# Patient Record
Sex: MALE | Race: WHITE | NOT HISPANIC OR LATINO
[De-identification: names, ages, dates, MRNs, and addresses within clinical notes are randomized per-mention and may not be internally consistent; named-entity substitution may affect disease eponyms.]

---

## 2022-12-08 DIAGNOSIS — Z86.79 PERSONAL HISTORY OF OTHER DISEASES OF THE CIRCULATORY SYSTEM: ICD-10-CM

## 2022-12-08 DIAGNOSIS — Z86.39 PERSONAL HISTORY OF OTHER ENDOCRINE, NUTRITIONAL AND METABOLIC DISEASE: ICD-10-CM

## 2022-12-08 DIAGNOSIS — Z87.39 PERSONAL HISTORY OF OTHER DISEASES OF THE MUSCULOSKELETAL SYSTEM AND CONNECTIVE TISSUE: ICD-10-CM

## 2022-12-08 DIAGNOSIS — Z78.9 OTHER SPECIFIED HEALTH STATUS: ICD-10-CM

## 2022-12-08 DIAGNOSIS — Z87.891 PERSONAL HISTORY OF NICOTINE DEPENDENCE: ICD-10-CM

## 2022-12-12 RX ORDER — OMEGA-3/DHA/EPA/FISH OIL 300-1000MG
CAPSULE ORAL
Refills: 0 | Status: ACTIVE | COMMUNITY

## 2022-12-12 RX ORDER — LOSARTAN POTASSIUM 100 MG/1
100 TABLET, FILM COATED ORAL
Refills: 0 | Status: ACTIVE | COMMUNITY

## 2022-12-12 RX ORDER — ALLOPURINOL 300 MG/1
300 TABLET ORAL
Refills: 0 | Status: ACTIVE | COMMUNITY

## 2022-12-12 RX ORDER — ATORVASTATIN CALCIUM 10 MG/1
10 TABLET, FILM COATED ORAL
Refills: 0 | Status: ACTIVE | COMMUNITY

## 2022-12-12 RX ORDER — METOLAZONE 5 MG/1
5 TABLET ORAL
Refills: 0 | Status: ACTIVE | COMMUNITY

## 2022-12-12 RX ORDER — METOPROLOL SUCCINATE 50 MG/1
50 TABLET, EXTENDED RELEASE ORAL
Refills: 0 | Status: ACTIVE | COMMUNITY

## 2022-12-12 RX ORDER — MULTIVITAMIN
TABLET ORAL
Refills: 0 | Status: ACTIVE | COMMUNITY

## 2022-12-12 RX ORDER — APIXABAN 5 MG/1
5 TABLET, FILM COATED ORAL
Refills: 0 | Status: ACTIVE | COMMUNITY

## 2022-12-12 RX ORDER — POTASSIUM CHLORIDE 1500 MG/1
20 TABLET, FILM COATED, EXTENDED RELEASE ORAL
Refills: 0 | Status: ACTIVE | COMMUNITY

## 2022-12-13 DIAGNOSIS — Z84.0 FAMILY HISTORY OF DISEASES OF THE SKIN AND SUBCUTANEOUS TISSUE: ICD-10-CM

## 2022-12-13 DIAGNOSIS — L40.9 PSORIASIS, UNSPECIFIED: ICD-10-CM

## 2022-12-13 DIAGNOSIS — L03.119 CELLULITIS OF UNSPECIFIED PART OF LIMB: ICD-10-CM

## 2022-12-13 DIAGNOSIS — R60.9 EDEMA, UNSPECIFIED: ICD-10-CM

## 2022-12-14 ENCOUNTER — APPOINTMENT (OUTPATIENT)
Dept: CARDIOLOGY | Facility: CLINIC | Age: 82
End: 2022-12-14

## 2022-12-14 ENCOUNTER — NON-APPOINTMENT (OUTPATIENT)
Age: 82
End: 2022-12-14

## 2022-12-14 VITALS
HEART RATE: 82 BPM | OXYGEN SATURATION: 94 % | BODY MASS INDEX: 38.22 KG/M2 | HEIGHT: 71 IN | SYSTOLIC BLOOD PRESSURE: 170 MMHG | WEIGHT: 273 LBS | DIASTOLIC BLOOD PRESSURE: 75 MMHG

## 2022-12-14 PROCEDURE — 93000 ELECTROCARDIOGRAM COMPLETE: CPT

## 2022-12-14 PROCEDURE — 99204 OFFICE O/P NEW MOD 45 MIN: CPT | Mod: 25

## 2022-12-14 RX ORDER — RISANKIZUMAB-RZAA 150 MG/ML
150 INJECTION SUBCUTANEOUS
Refills: 0 | Status: ACTIVE | COMMUNITY

## 2022-12-14 NOTE — DISCUSSION/SUMMARY
[FreeTextEntry1] : Cardiovascular perspective, he does not have any symptoms suggestive of angina, congestive heart failure or recurrent arrhythmia.  Of note, he was asymptomatic during his atrial fibrillation.  He is anticoagulated, and tolerating it well.\par \par Unfortunately, he has a very well developed abscess on his back, with some spontaneous drainage.  I spent 20 minutes expressing pus from the region.  A dressing was applied.  I placed a phone call to his primary care physician to get some assistance with referring him to surgery for incision and drainage, as well as antibiotics.  They will be in contact with him this afternoon to facilitate the appropriate care for this.\par \par I have requested records from his cardiologist in [place], the [facility], and his primary care physician, for my review.

## 2022-12-14 NOTE — HISTORY OF PRESENT ILLNESS
[FreeTextEntry1] : Evaristo presented to the office today for a cardiovascular evaluation.  He presents for the further evaluation of atrial fibrillation\par \par He is now 82 years old, with a history of hypertension.  He has a history of hyperlipidemia.  He does not have a history of diabetes.  He has a history of paroxysmal atrial fibrillation, which was detected initially while he was hospitalized for cellulitis, in [place].  He has been anticoagulated, and seemingly has been generally in sinus rhythm.\par \par At baseline, he is sedentary.  He plays golf for exercise, but does not do any organized exercise.  He walks with difficulty at times.  With regular physical activities, he feels well, without reproducible chest discomfort or shortness of breath suggestive of angina.  He denies orthopnea, PND.  He has a tendency toward lower extremity edema, possibly on the basis of venous insufficiency, worsened by obesity.  He has had cellulitis issues on the basis of his edema, requiring antibiotics, and hospitalization at times.  He denies palpitations, dizziness and syncope.\par \par He was hospitalized last year with cellulitis.  While he was hospitalized, he developed paroxysmal atrial fibrillation.  He reverted back to sinus rhythm spontaneously.  He was anticoagulated, and discharged on antibiotics and Eliquis.  Testing was done in the hospital which was reportedly normal, but those records are unavailable for review at this time.  Separately, he has been dealing with a draining "cyst "on his back, which has been uncomfortable for the last week. Negative Screen

## 2023-09-27 ENCOUNTER — NON-APPOINTMENT (OUTPATIENT)
Age: 83
End: 2023-09-27

## 2023-09-27 ENCOUNTER — APPOINTMENT (OUTPATIENT)
Dept: CARDIOLOGY | Facility: CLINIC | Age: 83
End: 2023-09-27
Payer: COMMERCIAL

## 2023-09-27 VITALS
OXYGEN SATURATION: 98 % | BODY MASS INDEX: 36.68 KG/M2 | DIASTOLIC BLOOD PRESSURE: 82 MMHG | WEIGHT: 262 LBS | HEART RATE: 73 BPM | HEIGHT: 71 IN | SYSTOLIC BLOOD PRESSURE: 157 MMHG

## 2023-09-27 PROCEDURE — 93000 ELECTROCARDIOGRAM COMPLETE: CPT

## 2023-09-27 PROCEDURE — 99214 OFFICE O/P EST MOD 30 MIN: CPT | Mod: 25

## 2023-10-19 ENCOUNTER — NON-APPOINTMENT (OUTPATIENT)
Age: 83
End: 2023-10-19

## 2023-10-19 ENCOUNTER — APPOINTMENT (OUTPATIENT)
Dept: CARDIOLOGY | Facility: CLINIC | Age: 83
End: 2023-10-19
Payer: COMMERCIAL

## 2023-10-19 VITALS — SYSTOLIC BLOOD PRESSURE: 152 MMHG | DIASTOLIC BLOOD PRESSURE: 70 MMHG

## 2023-10-19 VITALS
HEART RATE: 73 BPM | SYSTOLIC BLOOD PRESSURE: 173 MMHG | OXYGEN SATURATION: 96 % | HEIGHT: 71 IN | DIASTOLIC BLOOD PRESSURE: 83 MMHG | BODY MASS INDEX: 36.82 KG/M2 | WEIGHT: 263 LBS

## 2023-10-19 DIAGNOSIS — I48.0 PAROXYSMAL ATRIAL FIBRILLATION: ICD-10-CM

## 2023-10-19 DIAGNOSIS — I10 ESSENTIAL (PRIMARY) HYPERTENSION: ICD-10-CM

## 2023-10-19 PROCEDURE — 93000 ELECTROCARDIOGRAM COMPLETE: CPT

## 2023-10-19 PROCEDURE — 99214 OFFICE O/P EST MOD 30 MIN: CPT | Mod: 25

## 2023-10-19 RX ORDER — DULAGLUTIDE 3 MG/.5ML
3 INJECTION, SOLUTION SUBCUTANEOUS
Refills: 0 | Status: ACTIVE | COMMUNITY

## 2024-06-04 ENCOUNTER — EMERGENCY (EMERGENCY)
Facility: HOSPITAL | Age: 84
LOS: 1 days | Discharge: HOME | End: 2024-06-04
Attending: EMERGENCY MEDICINE | Admitting: EMERGENCY MEDICINE
Payer: COMMERCIAL

## 2024-06-04 VITALS
DIASTOLIC BLOOD PRESSURE: 57 MMHG | SYSTOLIC BLOOD PRESSURE: 135 MMHG | RESPIRATION RATE: 20 BRPM | WEIGHT: 242.07 LBS | OXYGEN SATURATION: 98 % | HEART RATE: 82 BPM | TEMPERATURE: 97 F | HEIGHT: 70 IN

## 2024-06-04 VITALS
SYSTOLIC BLOOD PRESSURE: 137 MMHG | HEART RATE: 79 BPM | OXYGEN SATURATION: 98 % | RESPIRATION RATE: 18 BRPM | DIASTOLIC BLOOD PRESSURE: 62 MMHG | TEMPERATURE: 97 F

## 2024-06-04 PROCEDURE — 99284 EMERGENCY DEPT VISIT MOD MDM: CPT

## 2024-06-04 PROCEDURE — 70450 CT HEAD/BRAIN W/O DYE: CPT | Mod: MC

## 2024-06-04 PROCEDURE — 99284 EMERGENCY DEPT VISIT MOD MDM: CPT | Mod: 25

## 2024-06-04 PROCEDURE — 70450 CT HEAD/BRAIN W/O DYE: CPT | Mod: 26,MC

## 2024-06-04 PROCEDURE — 72125 CT NECK SPINE W/O DYE: CPT | Mod: 26,MC

## 2024-06-04 PROCEDURE — 72125 CT NECK SPINE W/O DYE: CPT | Mod: MC

## 2024-06-04 RX ORDER — BACITRACIN ZINC 500 UNIT/G
1 OINTMENT IN PACKET (EA) TOPICAL ONCE
Refills: 0 | Status: COMPLETED | OUTPATIENT
Start: 2024-06-04 | End: 2024-06-04

## 2024-06-04 RX ADMIN — Medication 1 APPLICATION(S): at 21:08

## 2024-06-04 NOTE — ED ADULT NURSE REASSESSMENT NOTE - NS ED NURSE REASSESS COMMENT FT1
Skin tears cleaned and bacitracin applied, clean dry dressings in place now. Pt ambulating without issues, steady gait. Denies headache, dizziness, vision changes now. Back pain 3/10, does not want pain meds. No IV in place. VSS. Educated on dc and results. Left ER with son without issues.

## 2024-06-04 NOTE — ED PROVIDER NOTE - CLINICAL SUMMARY MEDICAL DECISION MAKING FREE TEXT BOX
84-year-old male on Eliquis, slip and fall hit head, will follow-up CT head, CT cervical spine, patient states that he otherwise feels well, states he is up-to-date with tetanus, and does not want any pain medications at this time

## 2024-06-04 NOTE — ED PROVIDER NOTE - PATIENT PORTAL LINK FT
You can access the FollowMyHealth Patient Portal offered by [facility] by registering at the following website: [email or web address]. By joining [employer]s FollowMyHealth portal, you will also be able to view your health information using other applications (apps) compatible with our system.

## 2024-06-04 NOTE — ED PROVIDER NOTE - PHYSICAL EXAMINATION
Left dorsum of hand small skin tear, left elbow small skin tears noted, patient able to flex and extend elbow, able to open and close hand,

## 2024-06-04 NOTE — ED ADULT NURSE NOTE - OBJECTIVE STATEMENT
Reports tripping on the steps and falling down5 stairs. Reports hitting his head, on Eliquis, denies LOC, denies headache now. Denies dizziness or vision changes. Reports 3/10 generalized back pain. Denies symptoms prior to fall. Skin tears noted to left lower arm, left elbow, and left upper arm. Bacitracin applied to skin tears and clean dry dressing in place. A/O x4, calm and cooperative. Ambulating with steady gait now. Son at bedside.

## 2024-06-04 NOTE — ED ADULT NURSE NOTE - NSFALLRISKINTERV_ED_ALL_ED

## 2024-06-04 NOTE — ED PROVIDER NOTE - NSFOLLOWUPINSTRUCTIONS_ED_ALL_ED_FT
A skin tear is a wound in which the top layers of skin have peeled off from the deeper skin or tissues underneath. This is a common problem as people get older because the skin becomes thinner and more fragile. In addition, some medicines, such as oral corticosteroids, can lead to thinning skin if they are taken for long periods of time.    A skin tear is often repaired with tape or skin adhesive strips. Depending on the location of the wound, a bandage (dressing) may be applied over the tape or adhesive strips.    Follow these instructions at home:  Wound care      Clean the wound as told by your health care provider. You may be instructed to keep the wound dry for the first few days. If you are told to clean the wound:  Wash the wound as told by your health care provider. This may include using mild soap and water, a wound cleanser, or a salt–water (saline) solution.  If using soap, rinse the wound with water to remove all soap.  Do not rub the wound dry. Pat it gently with a clean towel or let it air-dry.  Change any dressings as told by your health care provider. This may include changing the dressing if it gets wet, gets dirty, or starts to smell bad.  Wash your hands with soap and water for at least 20 seconds before and after you change your bandage (dressing). If soap and water are not available, use hand .  Leave tape or skin adhesive strips in place. These skin closures may need to stay in place for 2 weeks or longer. If adhesive strip edges start to loosen and curl up, you may trim the loose edges. Do not remove adhesive strips completely unless your health care provider tells you to do that.  Check your wound every day for signs of infection. Check for:  Redness, swelling, or pain.  More fluid or blood.  Warmth.  Pus or a bad smell.  Do not scratch or pick at the wound.  Protect the injured area until it has healed.  Medicines    Take or apply over-the-counter and prescription medicines only as told by your health care provider.  If you were prescribed an antibiotic medicine, take or apply it as told by your health care provider. Do not stop using the antibiotic even if your condition improves.  General instructions      Keep the dressing dry as told by your health care provider.  Do not take baths, swim, use a hot tub, or do anything that puts your wound underwater until your health care provider approves. Ask your health care provider if you may take showers. You may only be allowed to take sponge baths.  Keep all follow-up visits. This is important.  Contact a health care provider if:  You have redness, swelling, or pain around your wound.  You have more fluid or blood coming from your wound.  Your wound, or the area around your wound, feels warm to the touch.  You have pus or a bad smell coming from your wound.  Get help right away if:  You have a red streak that goes away from the skin tear.  You have a fever and chills, and your symptoms suddenly get worse.  Summary  A skin tear is a wound in which the top layers of skin have peeled off from the deeper skin or tissues underneath.  A skin tear is often repaired with tape or skin adhesive strips, and a bandage (dressing) may be applied over the tape or the adhesive strips.  Change any dressings as told by your health care provider.  Take or apply over-the-counter and prescription medicines only as told by your health care provider.  Contact a health care provider if you have signs of infection.  This information is not intended to replace advice given to you by your health care provider. Make sure you discuss any questions you have with your health care provider.    Document Revised: 03/24/2021 Document Reviewed: 03/24/2021

## 2024-06-04 NOTE — ED ADULT NURSE NOTE - PAIN: PRESENCE, MLM
Eliptical Excision Additional Text (Leave Blank If You Do Not Want): The margin was drawn around the clinically apparent lesion.  An elliptical shape was then drawn on the skin incorporating the lesion and margins.  Incisions were then made along these lines to the appropriate tissue plane and the lesion was extirpated. complains of pain/discomfort

## 2024-06-04 NOTE — ED PROVIDER NOTE - CARE PLAN
1 Principal Discharge DX:	Skin tear of upper arm without complication, left, initial encounter  Secondary Diagnosis:	Fall in home

## 2024-06-04 NOTE — ED PROVIDER NOTE - OBJECTIVE STATEMENT
84-year-old male PMH of HTN, HLD, psoriasis, PAF on Eliquis, presents emergency department with son-in-law, states that he was going up his stairs, and slipped with his slippers falling backwards hitting the back of his head, no loss of consciousness, patient was helped up and is ambulatory without assistance, patient has left hand and left elbow skin tear.  Patient states that he has back pain.